# Patient Record
Sex: MALE | URBAN - NONMETROPOLITAN AREA
[De-identification: names, ages, dates, MRNs, and addresses within clinical notes are randomized per-mention and may not be internally consistent; named-entity substitution may affect disease eponyms.]

---

## 2023-11-13 ENCOUNTER — APPOINTMENT (OUTPATIENT)
Dept: URBAN - NONMETROPOLITAN AREA CLINIC 56 | Age: 23
Setting detail: DERMATOLOGY
End: 2023-11-13

## 2023-11-13 DIAGNOSIS — D22 MELANOCYTIC NEVI: ICD-10-CM

## 2023-11-13 DIAGNOSIS — Z71.89 OTHER SPECIFIED COUNSELING: ICD-10-CM

## 2023-11-13 PROBLEM — D22.72 MELANOCYTIC NEVI OF LEFT LOWER LIMB, INCLUDING HIP: Status: ACTIVE | Noted: 2023-11-13

## 2023-11-13 PROCEDURE — 99202 OFFICE O/P NEW SF 15 MIN: CPT

## 2023-11-13 PROCEDURE — OTHER OBSERVATION: OTHER

## 2023-11-13 PROCEDURE — OTHER DEFER: OTHER

## 2023-11-13 PROCEDURE — OTHER PATIENT SPECIFIC COUNSELING: OTHER

## 2023-11-13 PROCEDURE — OTHER MIPS QUALITY: OTHER

## 2023-11-13 PROCEDURE — OTHER COUNSELING: OTHER

## 2023-11-13 ASSESSMENT — LOCATION SIMPLE DESCRIPTION DERM: LOCATION SIMPLE: LEFT 4TH TOE

## 2023-11-13 ASSESSMENT — LOCATION ZONE DERM: LOCATION ZONE: TOE

## 2023-11-13 ASSESSMENT — LOCATION DETAILED DESCRIPTION DERM: LOCATION DETAILED: LEFT MEDIAL 4TH TOE

## 2023-11-13 NOTE — PROCEDURE: PATIENT SPECIFIC COUNSELING
The patient is about to have insurance and would like to wait to biopsy until insurance. Explained bc grew quickly I feel best to biopsy, although appearance is not dramatic under dermatoscope.
Detail Level: Detailed

## 2023-11-13 NOTE — PROCEDURE: OBSERVATION
Sodium level is 139, stable. No new orders. Message sent to patient to notify.  
Detail Level: Detailed
Size Of Lesion In Cm (Optional): 1